# Patient Record
Sex: MALE | Race: WHITE | NOT HISPANIC OR LATINO | Employment: UNEMPLOYED | URBAN - METROPOLITAN AREA
[De-identification: names, ages, dates, MRNs, and addresses within clinical notes are randomized per-mention and may not be internally consistent; named-entity substitution may affect disease eponyms.]

---

## 2017-04-27 PROBLEM — K58.1 IRRITABLE BOWEL SYNDROME WITH CONSTIPATION: Status: ACTIVE | Noted: 2017-04-27

## 2018-08-30 PROBLEM — K05.6 CHRONIC PERIODONTAL DISEASE: Status: ACTIVE | Noted: 2018-08-30

## 2018-08-30 PROBLEM — K02.9 DENTAL CARIES: Status: ACTIVE | Noted: 2018-08-30

## 2020-06-12 PROBLEM — Z85.038 HISTORY OF COLON CANCER, STAGE I: Status: ACTIVE | Noted: 2020-06-12

## 2020-06-25 ENCOUNTER — NURSE TRIAGE (OUTPATIENT)
Dept: ADMINISTRATIVE | Facility: CLINIC | Age: 65
End: 2020-06-25

## 2020-06-25 NOTE — TELEPHONE ENCOUNTER
Pt contacted through the Post Procedural Symptom Tracker. No answer. No additional contact today as per post procedure protocol. dy 13    Reason for Disposition   No answer.  First attempt to contact caller.  Follow-up call scheduled within 15 minutes.    Protocols used: NO CONTACT OR DUPLICATE CONTACT CALL-A-[place]

## 2020-06-26 NOTE — TELEPHONE ENCOUNTER
Reason for Disposition   Information only question and nurse able to answer    Protocols used: NO PROTOCOL AVAILABLE - INFORMATION ONLY-A-[facility]

## 2020-09-11 PROBLEM — Z90.89 HISTORY OF RIGHT MASTOIDECTOMY: Status: ACTIVE | Noted: 2020-09-11

## 2020-09-11 PROBLEM — Z98.890: Status: ACTIVE | Noted: 2020-09-11

## 2021-03-04 PROBLEM — G89.29 CHRONIC BILATERAL LOW BACK PAIN WITHOUT SCIATICA: Status: ACTIVE | Noted: 2021-03-04

## 2021-03-04 PROBLEM — M25.531 BILATERAL WRIST PAIN: Status: ACTIVE | Noted: 2021-03-04

## 2021-03-04 PROBLEM — R80.8 OTHER PROTEINURIA: Status: ACTIVE | Noted: 2021-03-04

## 2021-03-04 PROBLEM — M25.532 BILATERAL WRIST PAIN: Status: ACTIVE | Noted: 2021-03-04

## 2021-03-04 PROBLEM — M54.50 CHRONIC BILATERAL LOW BACK PAIN WITHOUT SCIATICA: Status: ACTIVE | Noted: 2021-03-04

## 2021-03-04 PROBLEM — M54.2 CERVICALGIA: Status: ACTIVE | Noted: 2021-03-04

## 2021-07-01 ENCOUNTER — PATIENT MESSAGE (OUTPATIENT)
Dept: ADMINISTRATIVE | Facility: OTHER | Age: 66
End: 2021-07-01

## 2021-07-09 PROBLEM — Z90.49 HISTORY OF COLON RESECTION: Status: ACTIVE | Noted: 2021-07-09

## 2021-08-10 PROBLEM — Z90.89 HISTORY OF RIGHT MASTOIDECTOMY: Status: RESOLVED | Noted: 2020-09-11 | Resolved: 2021-08-10

## 2021-08-18 PROBLEM — M79.89 LEFT ARM SWELLING: Status: ACTIVE | Noted: 2021-08-18

## 2021-08-18 PROBLEM — R07.89 OTHER CHEST PAIN: Status: ACTIVE | Noted: 2021-08-18

## 2021-08-18 PROBLEM — I89.0 LYMPHEDEMA: Status: ACTIVE | Noted: 2021-08-18

## 2021-08-18 PROBLEM — R22.2 CHEST MASS: Status: ACTIVE | Noted: 2021-08-18

## 2021-10-25 PROBLEM — M79.89 LEFT UPPER EXTREMITY SWELLING: Status: ACTIVE | Noted: 2021-10-25

## 2021-10-25 PROBLEM — Z87.891 HISTORY OF TOBACCO ABUSE: Status: ACTIVE | Noted: 2021-10-25

## 2022-01-04 DIAGNOSIS — U07.1 COVID-19 VIRUS DETECTED: ICD-10-CM

## 2022-03-07 PROBLEM — K22.70 BARRETT'S ESOPHAGUS WITHOUT DYSPLASIA: Status: ACTIVE | Noted: 2022-03-07

## 2022-09-16 PROBLEM — Z86.16 HISTORY OF COVID-19: Status: ACTIVE | Noted: 2022-09-16

## 2022-09-16 PROBLEM — Z90.89 HISTORY OF MASTOIDECTOMY: Status: ACTIVE | Noted: 2022-09-16

## 2022-09-16 PROBLEM — K21.00 GASTROESOPHAGEAL REFLUX DISEASE WITH ESOPHAGITIS WITHOUT HEMORRHAGE: Status: ACTIVE | Noted: 2022-09-16

## 2022-09-16 PROBLEM — Z96.21 HISTORY OF COCHLEAR IMPLANT: Status: ACTIVE | Noted: 2022-09-16

## 2024-02-22 PROBLEM — N18.2 CKD (CHRONIC KIDNEY DISEASE) STAGE 2, GFR 60-89 ML/MIN: Status: ACTIVE | Noted: 2024-02-22

## 2024-10-10 ENCOUNTER — PATIENT MESSAGE (OUTPATIENT)
Dept: ADMINISTRATIVE | Facility: HOSPITAL | Age: 69
End: 2024-10-10
Payer: MEDICARE

## 2024-11-14 ENCOUNTER — PATIENT OUTREACH (OUTPATIENT)
Dept: ADMINISTRATIVE | Facility: HOSPITAL | Age: 69
End: 2024-11-14
Payer: MEDICARE

## 2024-11-14 NOTE — PROGRESS NOTES
Contacted pt in reference to the HTN-gap report. Pt will look for BP machine. Pt agreed to a call back on tomorrow

## 2024-11-15 ENCOUNTER — PATIENT OUTREACH (OUTPATIENT)
Dept: ADMINISTRATIVE | Facility: HOSPITAL | Age: 69
End: 2024-11-15
Payer: MEDICARE

## 2024-11-15 VITALS — SYSTOLIC BLOOD PRESSURE: 144 MMHG | DIASTOLIC BLOOD PRESSURE: 95 MMHG

## 2024-11-15 NOTE — PROGRESS NOTES
Contacted pt in reference to follow up the HTN-gap report. Pt  provide a remote BP reading 144/95

## 2024-11-26 ENCOUNTER — PATIENT MESSAGE (OUTPATIENT)
Dept: ADMINISTRATIVE | Facility: HOSPITAL | Age: 69
End: 2024-11-26
Payer: MEDICARE

## 2024-12-23 ENCOUNTER — PATIENT MESSAGE (OUTPATIENT)
Dept: ADMINISTRATIVE | Facility: HOSPITAL | Age: 69
End: 2024-12-23
Payer: MEDICARE

## 2025-03-31 ENCOUNTER — PATIENT MESSAGE (OUTPATIENT)
Dept: ADMINISTRATIVE | Facility: HOSPITAL | Age: 70
End: 2025-03-31
Payer: MEDICARE